# Patient Record
Sex: FEMALE | Race: WHITE | NOT HISPANIC OR LATINO | URBAN - METROPOLITAN AREA
[De-identification: names, ages, dates, MRNs, and addresses within clinical notes are randomized per-mention and may not be internally consistent; named-entity substitution may affect disease eponyms.]

---

## 2018-01-18 ENCOUNTER — OFFICE (OUTPATIENT)
Dept: URBAN - METROPOLITAN AREA CLINIC 78 | Facility: CLINIC | Age: 33
End: 2018-01-18
Payer: MEDICAID

## 2018-01-18 VITALS
DIASTOLIC BLOOD PRESSURE: 62 MMHG | HEART RATE: 92 BPM | SYSTOLIC BLOOD PRESSURE: 119 MMHG | TEMPERATURE: 98 F | WEIGHT: 264 LBS | HEIGHT: 66 IN

## 2018-01-18 DIAGNOSIS — Z33.1 PREGNANT STATE, INCIDENTAL: ICD-10-CM

## 2018-01-18 DIAGNOSIS — E11.9 TYPE 2 DIABETES MELLITUS WITHOUT COMPLICATIONS: ICD-10-CM

## 2018-01-18 DIAGNOSIS — B18.2 CHRONIC VIRAL HEPATITIS C: ICD-10-CM

## 2018-01-18 PROCEDURE — 99204 OFFICE O/P NEW MOD 45 MIN: CPT

## 2018-01-18 NOTE — SERVICEHPINOTES
DANIELLE ORDOÑEZ   is a   32   year old    female who is being seen in consultation at the request of   GRISELDA NG   for hepatitis C. She is 16 weeks pregnant. She has known of her diagnosis since approximately 2012 and is treatment-naive. She has h/o IVDU. 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 Reports negative HIV testing in past. She has had diabetes since 2006. Otherwise denies any GI complaints. No h/o jaundice. No other concerns today, just wants to get Hep C evaluated and plan for treatment later this year. Baby is due July 3rd and she is hoping to breastfeed.11/24/17 Hep C ab >11